# Patient Record
Sex: MALE | Race: BLACK OR AFRICAN AMERICAN | Employment: UNEMPLOYED | URBAN - METROPOLITAN AREA
[De-identification: names, ages, dates, MRNs, and addresses within clinical notes are randomized per-mention and may not be internally consistent; named-entity substitution may affect disease eponyms.]

---

## 2017-12-21 ENCOUNTER — HOSPITAL ENCOUNTER (EMERGENCY)
Age: 42
Discharge: HOME | End: 2017-12-21
Attending: EMERGENCY MEDICINE
Payer: SELF-PAY

## 2017-12-21 ENCOUNTER — APPOINTMENT (OUTPATIENT)
Dept: GENERAL RADIOLOGY | Age: 42
End: 2017-12-21
Attending: EMERGENCY MEDICINE
Payer: SELF-PAY

## 2017-12-21 VITALS
RESPIRATION RATE: 20 BRPM | HEART RATE: 101 BPM | WEIGHT: 160 LBS | OXYGEN SATURATION: 98 % | HEIGHT: 69 IN | BODY MASS INDEX: 23.7 KG/M2 | TEMPERATURE: 98 F | SYSTOLIC BLOOD PRESSURE: 139 MMHG | DIASTOLIC BLOOD PRESSURE: 95 MMHG

## 2017-12-21 DIAGNOSIS — M25.551 ACUTE RIGHT HIP PAIN: ICD-10-CM

## 2017-12-21 DIAGNOSIS — K62.5 RECTAL BLEEDING: Primary | ICD-10-CM

## 2017-12-21 PROCEDURE — 72170 X-RAY EXAM OF PELVIS: CPT

## 2017-12-21 PROCEDURE — 99283 EMERGENCY DEPT VISIT LOW MDM: CPT | Performed by: EMERGENCY MEDICINE

## 2017-12-21 RX ORDER — DICLOFENAC SODIUM 50 MG/1
50 TABLET, DELAYED RELEASE ORAL 2 TIMES DAILY
Qty: 14 TAB | Refills: 0 | Status: SHIPPED | OUTPATIENT
Start: 2017-12-21

## 2017-12-21 NOTE — ED TRIAGE NOTES
Pt reports for the past few days he is having blood in his stool and when he wiped there was bright red blood. Pt states he slipped and fell at work today and has right sided groin pain.

## 2017-12-21 NOTE — DISCHARGE INSTRUCTIONS
Return with any fevers, vomiting, difficulty breathing, worsening symptoms, or distal concerns. Follow-up with a primary care doctor for reevaluation as needed.

## 2017-12-21 NOTE — ED NOTES
I have reviewed discharge instructions with the patient. The patient verbalized understanding. Patient left ED via Discharge Method: ambulatory to Home with self. Opportunity for questions and clarification provided. Patient given 1 scripts. To continue your aftercare when you leave the hospital, you may receive an automated call from our care team to check in on how you are doing. This is a free service and part of our promise to provide the best care and service to meet your aftercare needs.  If you have questions, or wish to unsubscribe from this service please call [phone]. Thank you for Choosing our [facility] Emergency Department.

## 2017-12-21 NOTE — ED TRIAGE NOTES
Pt also reports that he was tested and showed positive for hep-c at one place and was tested at some other placed and it came back negative.

## 2017-12-21 NOTE — LETTER
[facility] EMERGENCY DEPT [address] [address] 
[phone] Work/School Note Date: 12/21/2017 To Whom It May concern: 
 
Svetlana Edge was seen and treated today in the emergency room by the following provider(s): 
Attending Provider: Kesha Wilks MD.   
 
Svetlana Edge was seen today 12/21/2017 Sincerely, 
 
 
 
 
Janes Heard RN

## 2018-01-07 ENCOUNTER — HOSPITAL ENCOUNTER (EMERGENCY)
Age: 43
Discharge: HOME | End: 2018-01-07
Attending: EMERGENCY MEDICINE
Payer: SELF-PAY

## 2018-01-07 VITALS
SYSTOLIC BLOOD PRESSURE: 115 MMHG | OXYGEN SATURATION: 97 % | WEIGHT: 160 LBS | TEMPERATURE: 98.2 F | RESPIRATION RATE: 16 BRPM | HEIGHT: 69 IN | DIASTOLIC BLOOD PRESSURE: 82 MMHG | HEART RATE: 97 BPM | BODY MASS INDEX: 23.7 KG/M2

## 2018-01-07 DIAGNOSIS — R21 RASH: Primary | ICD-10-CM

## 2018-01-07 PROCEDURE — 74011636637 HC RX REV CODE- 636/637: Performed by: NURSE PRACTITIONER

## 2018-01-07 PROCEDURE — 99283 EMERGENCY DEPT VISIT LOW MDM: CPT | Performed by: NURSE PRACTITIONER

## 2018-01-07 RX ORDER — CETIRIZINE HYDROCHLORIDE 5 MG/1
5 TABLET, CHEWABLE ORAL DAILY
Qty: 5 TAB | Refills: 0 | Status: SHIPPED | OUTPATIENT
Start: 2018-01-07

## 2018-01-07 RX ORDER — PREDNISONE 20 MG/1
20 TABLET ORAL DAILY
Qty: 3 TAB | Refills: 0 | Status: SHIPPED | OUTPATIENT
Start: 2018-01-07 | End: 2018-01-10

## 2018-01-07 RX ADMIN — PREDNISONE 60 MG: 50 TABLET ORAL at 14:15

## 2018-01-07 NOTE — ED PROVIDER NOTES
HPI Comments: 52-year-old male who denies past medical history to the emergency Department with rash. He reports that he spent the night in an unknown stranger's home. He reports that he woke up itching all over. He has scattered red raised areas that he reports are very pruritic. No shortness of breath no wheezing no airway compromise. No fever chills nausea vomiting diarrhea cough congestion. No abdominal pain. No chest pain no palpitations. No difficulty with voice or bowel movements. Patient is a 40 y.o. male presenting with other event. The history is provided by the patient. No  was used. Other   This is a new problem. The current episode started 6 to 12 hours ago. The problem has been gradually worsening. Pertinent negatives include no chest pain, no abdominal pain, no headaches and no shortness of breath. No past medical history on file. No past surgical history on file. No family history on file. Social History     Social History    Marital status: SINGLE     Spouse name: N/A    Number of children: N/A    Years of education: N/A     Occupational History    Not on file. Social History Main Topics    Smoking status: Not on file    Smokeless tobacco: Not on file    Alcohol use Yes    Drug use: Not on file    Sexual activity: Not on file     Other Topics Concern    Not on file     Social History Narrative    No narrative on file         ALLERGIES: Review of patient's allergies indicates no known allergies. Review of Systems   Constitutional: Negative for chills and fever. HENT: Negative for facial swelling, mouth sores, sore throat and voice change. Eyes: Negative for discharge and redness. Respiratory: Negative for cough, shortness of breath and wheezing. Cardiovascular: Negative for chest pain and palpitations. Gastrointestinal: Negative for abdominal pain, nausea and vomiting.    Endocrine: Negative for cold intolerance and heat intolerance. Genitourinary: Negative for difficulty urinating and dysuria. Musculoskeletal: Negative for back pain and myalgias. Skin: Positive for rash. Negative for color change. Neurological: Negative for dizziness and headaches. Psychiatric/Behavioral: Negative for confusion and decreased concentration. Vitals:    01/07/18 1156   BP: 155/76   Pulse: 80   Resp: 16   Temp: 98.2 °F (36.8 °C)   SpO2: 96%   Weight: 72.6 kg (160 lb)   Height: 5' 9\" (1.753 m)            Physical Exam   Constitutional: He is oriented to person, place, and time. He appears well-developed and well-nourished. No distress. HENT:   Head: Normocephalic and atraumatic. Right Ear: External ear normal.   Left Ear: External ear normal.   Nose: Nose normal.   Eyes: Conjunctivae and EOM are normal. Pupils are equal, round, and reactive to light. Neck: Normal range of motion. Neck supple. Cardiovascular: Normal rate, regular rhythm and normal heart sounds. Pulmonary/Chest: Effort normal and breath sounds normal. No respiratory distress. He has no wheezes. Abdominal: Soft. Bowel sounds are normal. He exhibits no distension. There is no tenderness. Musculoskeletal: Normal range of motion. He exhibits no edema or tenderness. Neurological: He is alert and oriented to person, place, and time. No cranial nerve deficit. Coordination normal.   Skin: Skin is warm and dry. Rash noted. Psychiatric: He has a normal mood and affect. His behavior is normal. Judgment and thought content normal.   Nursing note and vitals reviewed. MDM  Number of Diagnoses or Management Options  Diagnosis management comments: 78-year-old male who denies past medical history to the emergency Department with rash. He reports that he spent the night in an unknown stranger's home. He reports that he woke up itching all over. He has scattered red raised areas that he reports are very pruritic.   No shortness of breath no wheezing no airway compromise. No fever chills nausea vomiting diarrhea cough congestion. No abdominal pain. No chest pain no palpitations. No difficulty with voice or bowel movements. Pt w appearance of insect bites to generalized body. No sign of infection, however itching and irritated. Due to multiple sites, will provide steroid in ed, home with low dose taper.     Risk of Complications, Morbidity, and/or Mortality  Presenting problems: minimal  Diagnostic procedures: minimal  Management options: minimal    Patient Progress  Patient progress: stable    ED Course       Procedures

## 2018-01-07 NOTE — ED TRIAGE NOTES
Pt arrives with complaints of \"bites\" to entire body. States he got drunk last night and let an \"old white arben\" take him home to the old [facility] house and then he woke up with \"bites all over\". Has not seen any bugs crawling.

## 2018-01-07 NOTE — ED NOTES
I have reviewed discharge instructions with the patient. The patient verbalized understanding. Patient left ED via Discharge Method: ambulatory to Home with self. Opportunity for questions and clarification provided. Patient given 2 scripts. To continue your aftercare when you leave the hospital, you may receive an automated call from our care team to check in on how you are doing. This is a free service and part of our promise to provide the best care and service to meet your aftercare needs.  If you have questions, or wish to unsubscribe from this service please call [phone]. Thank you for Choosing [facility].

## 2020-08-27 NOTE — ED PROVIDER NOTES
HPI Comments: 50-year-old male with a history of just getting out of half-way a few months ago after having been in for 21 year's presents with concerns about some rectal bleeding that has been present for the last 24 hours. He says when he has a bowel movement and there is some blood on the toilet paper. Additionally, today at work he says his leg slipped out from under him and he now has a minor pain in his right hip. Patient says that his bleeding has been painless and he has had no diarrhea, nausea, or vomiting. He says he has been able to walk on his hip that just is a little sore. Elements of this note were made using speech recognition software. As such, there may be errors of speech recognition present. Patient is a 40 y.o. male presenting with anal bleeding. The history is provided by the patient. Rectal Bleeding    Pertinent negatives include no abdominal pain, no dysuria, no chills, no fever, no nausea, no vomiting and no diarrhea. History reviewed. No pertinent past medical history. History reviewed. No pertinent surgical history. History reviewed. No pertinent family history. Social History     Social History    Marital status: SINGLE     Spouse name: N/A    Number of children: N/A    Years of education: N/A     Occupational History    Not on file. Social History Main Topics    Smoking status: Not on file    Smokeless tobacco: Not on file    Alcohol use Yes    Drug use: Not on file    Sexual activity: Not on file     Other Topics Concern    Not on file     Social History Narrative    No narrative on file         ALLERGIES: Review of patient's allergies indicates no known allergies. Review of Systems   Constitutional: Negative for chills, diaphoresis and fever. HENT: Negative for congestion, rhinorrhea and sore throat. Eyes: Negative for redness and visual disturbance.    Respiratory: Negative for cough, chest tightness, shortness of breath and Report given to [facility], Charge RN. IV secured with coban. Pt and  agree to report directly to [facility] for PE study. PT alert and oriented. Vitals stable. wheezing. Cardiovascular: Negative for chest pain and palpitations. Gastrointestinal: Positive for anal bleeding. Negative for abdominal pain, blood in stool, diarrhea, nausea and vomiting. Endocrine: Negative for polydipsia and polyuria. Genitourinary: Negative for dysuria and hematuria. Musculoskeletal: Positive for arthralgias. Negative for myalgias and neck stiffness. Skin: Negative for rash. Allergic/Immunologic: Negative for environmental allergies and food allergies. Neurological: Negative for dizziness, weakness and headaches. Hematological: Negative for adenopathy. Does not bruise/bleed easily. Psychiatric/Behavioral: Negative for confusion and sleep disturbance. The patient is not nervous/anxious. Vitals:    12/21/17 1646   BP: (!) 149/95   Pulse: 100   Resp: 20   Temp: 98 °F (36.7 °C)   SpO2: 96%   Weight: 72.6 kg (160 lb)   Height: 5' 9\" (1.753 m)            Physical Exam   Constitutional: He is oriented to person, place, and time. He appears well-developed and well-nourished. HENT:   Head: Normocephalic and atraumatic. Eyes: Conjunctivae and EOM are normal. Pupils are equal, round, and reactive to light. Neck: Normal range of motion. Cardiovascular: Normal rate and regular rhythm. Pulmonary/Chest: Effort normal and breath sounds normal. No respiratory distress. He has no wheezes. He has no rales. He exhibits no tenderness. Abdominal: Soft. Bowel sounds are normal. There is no rebound and no guarding. Genitourinary:   Genitourinary Comments: ppatient is a very small anal tear at about the 7 o'clock position. Musculoskeletal: Normal range of motion. He exhibits no edema or tenderness. Patient has full range of motion in the hip without pain. He does have some mild tenderness to palpation. Lymphadenopathy:     He has no cervical adenopathy. Neurological: He is alert and oriented to person, place, and time. Skin: Skin is warm and dry.    Psychiatric: He has a normal mood and affect. Nursing note and vitals reviewed. MDM  Number of Diagnoses or Management Options  Acute right hip pain:   Rectal bleeding:   Diagnosis management comments: X-rays unremarkable. Patient does not have any signs or symptoms of anemia so I do not think he needs any blood work. I will plan to discharge him home.     ED Course       Procedures

## 2023-08-21 ENCOUNTER — OFFICE VISIT (OUTPATIENT)
Dept: FAMILY MEDICINE CLINIC | Age: 48
End: 2023-08-21

## 2023-08-21 VITALS
RESPIRATION RATE: 18 BRPM | HEIGHT: 69 IN | OXYGEN SATURATION: 97 % | HEART RATE: 100 BPM | TEMPERATURE: 98.2 F | BODY MASS INDEX: 26.51 KG/M2 | DIASTOLIC BLOOD PRESSURE: 85 MMHG | SYSTOLIC BLOOD PRESSURE: 125 MMHG | WEIGHT: 179 LBS

## 2023-08-21 DIAGNOSIS — Z59.02 UNSHELTERED HOMELESSNESS: ICD-10-CM

## 2023-08-21 DIAGNOSIS — F12.90 CANNABIS USE DISORDER: ICD-10-CM

## 2023-08-21 DIAGNOSIS — Z76.89 FREQUENT PATIENT IN EMERGENCY DEPARTMENT: ICD-10-CM

## 2023-08-21 DIAGNOSIS — Z59.89 UNINSURED: ICD-10-CM

## 2023-08-21 DIAGNOSIS — F19.929: Primary | ICD-10-CM

## 2023-08-21 DIAGNOSIS — Z59.82 LACK OF ACCESS TO TRANSPORTATION: ICD-10-CM

## 2023-08-21 PROBLEM — F15.90 STIMULANT USE DISORDER: Status: ACTIVE | Noted: 2022-10-03

## 2023-08-21 PROCEDURE — 99203 OFFICE O/P NEW LOW 30 MIN: CPT | Performed by: NURSE PRACTITIONER

## 2023-08-21 RX ORDER — LISINOPRIL 40 MG/1
40 TABLET ORAL DAILY
Qty: 30 TABLET | Refills: 0 | COMMUNITY
Start: 2023-08-18 | End: 2023-09-17

## 2023-08-21 RX ORDER — BUPROPION HYDROCHLORIDE 75 MG/1
150 TABLET ORAL EVERY EVENING
Qty: 60 TABLET | Refills: 0 | Status: CANCELLED | OUTPATIENT
Start: 2023-08-21 | End: 2023-09-20

## 2023-08-21 RX ORDER — TRIHEXYPHENIDYL HYDROCHLORIDE 2 MG/1
TABLET ORAL
COMMUNITY
Start: 2023-08-18

## 2023-08-21 RX ORDER — BUPROPION HYDROCHLORIDE 75 MG/1
75 TABLET ORAL 2 TIMES DAILY
Qty: 60 TABLET | Refills: 0 | Status: CANCELLED | OUTPATIENT
Start: 2023-08-21

## 2023-08-21 RX ORDER — BUPROPION HYDROCHLORIDE 75 MG/1
150 TABLET ORAL EVERY EVENING
Qty: 60 TABLET | Refills: 0 | COMMUNITY
Start: 2023-08-18 | End: 2023-08-21 | Stop reason: DRUGHIGH

## 2023-08-21 RX ORDER — AMLODIPINE BESYLATE 10 MG/1
10 TABLET ORAL DAILY
COMMUNITY
Start: 2023-08-18

## 2023-08-21 RX ORDER — QUETIAPINE FUMARATE 50 MG/1
50 TABLET, FILM COATED ORAL 2 TIMES DAILY
Qty: 60 TABLET | Refills: 0 | COMMUNITY
Start: 2023-08-18 | End: 2023-09-17

## 2023-08-21 RX ORDER — AMLODIPINE BESYLATE 10 MG/1
10 TABLET ORAL DAILY
Qty: 30 TABLET | Refills: 0 | Status: CANCELLED | OUTPATIENT
Start: 2023-08-21

## 2023-08-21 RX ORDER — OMEPRAZOLE 20 MG/1
20 CAPSULE, DELAYED RELEASE ORAL DAILY
COMMUNITY
Start: 2023-08-18

## 2023-08-21 RX ORDER — GABAPENTIN 400 MG/1
CAPSULE ORAL
COMMUNITY
Start: 2023-08-18

## 2023-08-21 SDOH — ECONOMIC STABILITY - TRANSPORTATION SECURITY: TRANSPORTATION INSECURITY: Z59.82

## 2023-08-21 SDOH — ECONOMIC STABILITY - INCOME SECURITY: OTHER PROBLEMS RELATED TO HOUSING AND ECONOMIC CIRCUMSTANCES: Z59.89

## 2023-08-21 SDOH — ECONOMIC STABILITY - HOUSING INSECURITY: UNSHELTERED HOMELESSNESS: Z59.02

## 2023-08-21 ASSESSMENT — ENCOUNTER SYMPTOMS
CHEST TIGHTNESS: 0
COUGH: 0

## 2023-08-21 ASSESSMENT — LIFESTYLE VARIABLES
HOW OFTEN DO YOU HAVE A DRINK CONTAINING ALCOHOL: 2-3 TIMES A WEEK
HOW MANY STANDARD DRINKS CONTAINING ALCOHOL DO YOU HAVE ON A TYPICAL DAY: 3 OR 4